# Patient Record
(demographics unavailable — no encounter records)

---

## 2025-01-22 NOTE — PHYSICAL EXAM
[de-identified] : Neurologic: normal mood and affect, orientated and able to communicate Constitutional: well developed and well nourished  Right Knee:

## 2025-01-22 NOTE — ADDENDUM
[FreeTextEntry1] : Documented by eLbron Osman acting as a scribe for Dr. Jaquez and Pedro Real PA-C on 01/21/2025 and was presence for the following sections: Physical Exam; Data Reviewed; Assessment; Discussion/Summary. All medical record entries made by the Scribe were at my, Dr. Jaquez, and Pedro Real, direction and personally dictated by me on 01/21/2025. I have reviewed the chart and agree that the record accurately reflects my personal performance of the history, physical exam, procedure and imaging.

## 2025-01-22 NOTE — HISTORY OF PRESENT ILLNESS
[de-identified] : The patient is a 42 year old [RIGHT] hand dominant male who presents today complaining of right knee.   Date of Injury/Onset: chronic Pain:    At Rest: 3-4/10  With Activity:  _/10  Mechanism of injury: no specific RICKEY- chronic This is NOT a Work Related Injury being treated under Worker's Compensation. This is NOT an athletic injury occurring associated with an interscholastic or organized sports team. Quality of symptoms: intermittent swelling, constant pain, locking, instability with twisting, limited ROM Improves with: rest, aspiration Worse with: twisting, squatting, kneeling, getting out of bed or going sit to stand after prolonged sitting Prior treatment: RT knee meniscus repair in 2000, RT knee meniscectomy by Dr Jolly @ [facility] Jan2023 and October 2024 Prior Imaging: MRI @  11/11/24 Out of work/sport: currently working School/Sport/Position/Occupation:  Additional Information: patient primary complaint is intermittent swelling and locking after second surgery. reports 20-34 CC aspiration leading up to and after 3rd surgery. Interested in discussion cadaver surgical options, reports RT knee feels similar to how it did before original surgery. Wants to maintain active lifestyle, reports swelling and pain and ROM restriction limits ADL's and rec activity

## 2025-01-22 NOTE — ADDENDUM
[FreeTextEntry1] : Documented by Lebron Osman acting as a scribe for Dr. Jaquez and Pedro Real PA-C on 01/21/2025 and was presence for the following sections: Physical Exam; Data Reviewed; Assessment; Discussion/Summary. All medical record entries made by the Scribe were at my, Dr. Jaquez, and Pedro Real, direction and personally dictated by me on 01/21/2025. I have reviewed the chart and agree that the record accurately reflects my personal performance of the history, physical exam, procedure and imaging.

## 2025-01-22 NOTE — PHYSICAL EXAM
[de-identified] : Neurologic: normal mood and affect, orientated and able to communicate Constitutional: well developed and well nourished  Right Knee:

## 2025-01-22 NOTE — DISCUSSION/SUMMARY
[de-identified] : Reviewed all MRI images with patient today and interpretation was provided. Reviewed all X Ray images with patient today and interpretation was provided. Discussed revision meniscus repair vs uni-knee replacement as well as meniscus allograft transplantation Doctor email provided to patient. Patient will email regarding decision. Patient will follow up as needed.  friend of george lezama    ----------------------------------------------- Home Exercise The patient is instructed on a home exercise program.  MICHAEL OSMAN Acting as a Scribe for Dr. Leonid WINSTON, Michael Osman, attest that this documentation has been prepared under the direction and in the presence of Provider Dr. Jaquez.  Activity Modification The patient was advised to modify their activities.  Dx / Natural History The patient was advised of the diagnosis. The natural history of the pathology was explained in full to the patient in layman's terms. Several different treatment options were discussed and explained in full to the patient including the risks and benefits of both surgical and non-surgical treatments.  All questions and concerns were answered.  Pain Guide Activities The patient was advised to let pain guide the gradual advancement of activities.  RICE I explained to the patient that rest, ice, compression, and elevation would benefit them. They may return to activity after follow-up or when they no longer have any pain.  The patient's current medication management of their orthopedic diagnosis was reviewed today: (1) We discussed a comprehensive treatment plan that included possible pharmaceutical management involving the use of prescription strength medications including but not limited to options such as oral Naprosyn 500mg BID, once daily Meloxicam 15 mg, or 500-650 mg Tylenol versus over the counter oral medications and topical prescription NSAID Pennsaid vs over the counter Voltaren gel. (2) There is a moderate risk of morbidity with further treatment, especially from use of prescription strength medications and possible side effects of these medications which include upset stomach with oral medications, skin reactions to topical medications and cardiac/renal issues with long term use. (3) I recommended that the patient follow-up with their medical physician to discuss any significant specific potential issues with long term medication use such as interactions with current medications or with exacerbation of underlying medical comorbidities. (4) The benefits and risks associated with use of injectable, oral or topical, prescription and over the counter anti-inflammatory medications were discussed with the patient. The patient voiced understanding of the risks including but not limited to bleeding, stroke, kidney dysfunction, heart disease, and were referred to the black box warning label for further information.

## 2025-01-22 NOTE — DISCUSSION/SUMMARY
[de-identified] : Reviewed all MRI images with patient today and interpretation was provided. Reviewed all X Ray images with patient today and interpretation was provided. Discussed revision meniscus repair vs uni-knee replacement as well as meniscus allograft transplantation Doctor email provided to patient. Patient will email regarding decision. Patient will follow up as needed.  friend of george lezama    ----------------------------------------------- Home Exercise The patient is instructed on a home exercise program.  MICHAEL OSMAN Acting as a Scribe for Dr. Leonid WINSTON, Michael Osman, attest that this documentation has been prepared under the direction and in the presence of Provider Dr. Jaquez.  Activity Modification The patient was advised to modify their activities.  Dx / Natural History The patient was advised of the diagnosis. The natural history of the pathology was explained in full to the patient in layman's terms. Several different treatment options were discussed and explained in full to the patient including the risks and benefits of both surgical and non-surgical treatments.  All questions and concerns were answered.  Pain Guide Activities The patient was advised to let pain guide the gradual advancement of activities.  RICE I explained to the patient that rest, ice, compression, and elevation would benefit them. They may return to activity after follow-up or when they no longer have any pain.  The patient's current medication management of their orthopedic diagnosis was reviewed today: (1) We discussed a comprehensive treatment plan that included possible pharmaceutical management involving the use of prescription strength medications including but not limited to options such as oral Naprosyn 500mg BID, once daily Meloxicam 15 mg, or 500-650 mg Tylenol versus over the counter oral medications and topical prescription NSAID Pennsaid vs over the counter Voltaren gel. (2) There is a moderate risk of morbidity with further treatment, especially from use of prescription strength medications and possible side effects of these medications which include upset stomach with oral medications, skin reactions to topical medications and cardiac/renal issues with long term use. (3) I recommended that the patient follow-up with their medical physician to discuss any significant specific potential issues with long term medication use such as interactions with current medications or with exacerbation of underlying medical comorbidities. (4) The benefits and risks associated with use of injectable, oral or topical, prescription and over the counter anti-inflammatory medications were discussed with the patient. The patient voiced understanding of the risks including but not limited to bleeding, stroke, kidney dysfunction, heart disease, and were referred to the black box warning label for further information.

## 2025-01-22 NOTE — HISTORY OF PRESENT ILLNESS
[de-identified] : The patient is a 42 year old [RIGHT] hand dominant male who presents today complaining of right knee.   Date of Injury/Onset: chronic Pain:    At Rest: 3-4/10  With Activity:  _/10  Mechanism of injury: no specific RICKEY- chronic This is NOT a Work Related Injury being treated under Worker's Compensation. This is NOT an athletic injury occurring associated with an interscholastic or organized sports team. Quality of symptoms: intermittent swelling, constant pain, locking, instability with twisting, limited ROM Improves with: rest, aspiration Worse with: twisting, squatting, kneeling, getting out of bed or going sit to stand after prolonged sitting Prior treatment: RT knee meniscus repair in 2000, RT knee meniscectomy by Dr Jolly @ [facility] Jan2023 and October 2024 Prior Imaging: MRI @  11/11/24 Out of work/sport: currently working School/Sport/Position/Occupation:  Additional Information: patient primary complaint is intermittent swelling and locking after second surgery. reports 20-34 CC aspiration leading up to and after 3rd surgery. Interested in discussion cadaver surgical options, reports RT knee feels similar to how it did before original surgery. Wants to maintain active lifestyle, reports swelling and pain and ROM restriction limits ADL's and rec activity

## 2025-01-22 NOTE — DATA REVIEWED
[FreeTextEntry1] : 9/16/24 Z MRI Right Knee: This scan was reviewed and interpreted by Dr. Jaquez, and his findings are- IMPRESSION: 1.Postsurgical changes of the medial meniscus with recurrent horizontal tearing of the posterior horn. 2.Moderate partial-thickness cartilage loss in the medial compartment. 3.Mild chondromalacia in the lateral and patellofemoral compartments. 4.Small joint effusion and tiny Henry's cyst.  11/11/24 Z MRI Right Knee: This scan was reviewed and interpreted by Dr. Jaquez, and his findings are- IMPRESSION: 1.Moderate knee joint effusion. 2.Mild tricompartmental degenerative changes.  1/21/25 [facility] X-RAY Right Knee 4 views: This scan was reviewed and interpreted by Dr. Jaquez, and his findings are- mild to moderate medial OA